# Patient Record
(demographics unavailable — no encounter records)

---

## 2024-10-23 NOTE — PHYSICAL EXAM
[de-identified] : Well-appearing 47-year-old female with obesity.  Physical exam limited due to telehealth

## 2024-10-23 NOTE — HISTORY OF PRESENT ILLNESS
[Home] : at home, [unfilled] , at the time of the visit. [Medical Office: ([facility])___] : at the medical office located in  [Verbal consent obtained from patient] : the patient, [unfilled] [FreeTextEntry1] : Sandie is a 47-year-old female on Zepbound 10 mg here for follow-up regarding medical weight loss management.  Prior to Zepbound, patient used Wegovy for weight loss, weight loss effect plateaued after 6 months of Wegovy, highest dose reached was Wegovy 2.4 mg.  Patient today reports she is tolerating Zepbound 10 mg very well, denies any significant side effects aside from transient tiredness after the injection, which self resolves.  Weight at initial consultation in 2024: 310 pounds (BMI 51.59) Weight at today's follow-up: 297 pounds (BMI 49.42)  Medical history is significant for Hashimoto thyroiditis, borderline dyslipidemia.  Surgical history is significant for 1 . The patient denies prior history of blood clot or abnormal bleeding. The patient denies prior history of dysphagia or reflux.  Patient denies history of pancreatitis, denies personal or family history of thyroid cancer.  Patient reports she has thyroid nodules, which have been followed up by yearly ultrasound, most recent ultrasound was performed on May/23/24.  Patient denies symptoms including but not limited to change of voice, pain in the throat or neck region.  Ultrasound from May, 2324 demonstrated following: Right Lobe: 4.9 cm x 2.4 cm x 2.6 cm. Prominent in size. Normal in echogenicity. There is 2.0 x 1.7 x 1.2 cm heterogeneous predominantly isoechoic nodule in the lower pole laterally, unchanged. There are no calcifications. (TIRAD -3). There is 1.7 x 1.6 x 1.2 cm heterogeneous predominantly isoechoic nodule in the lower pole medially, unchanged. There are no calcifications. (TIRAD -3).  Left Lobe: 4.7 cm x 1.6 cm x 1.8 cm. Normal in size and echogenicity. There is 4 mm benign colloid cyst in upper pole, unchanged (TIRAD -1).  Isthmus: 4 mm.  Cervical Lymph Nodes: No enlarged or abnormal morphology cervical nodes.  IMPRESSION: Interval stability.  Right thyroid nodules, not significantly changed. Consider follow-up ultrasound in one year.  TI-RAD 3: Mildly suspicious (FNA if > 2.5 cm, Follow if > 1.5 cm)   Patient reports her PCP has discussed with her regarding FNA for thyroid nodules, however patient has not done so.

## 2024-10-23 NOTE — PHYSICAL EXAM
[de-identified] : Well-appearing 47-year-old female with obesity.  Physical exam limited due to telehealth

## 2024-10-23 NOTE — ASSESSMENT
[FreeTextEntry1] : I had a lengthy discussion with the patient regarding nutrition, exercise, weight loss medications.  Considering patient is tolerating Zepbound 10 mg well without any significant side effects, I discussed with patient regarding the option of increasing the dose to 12.5 mg, patient reports she prefers to stay on the 10 mg for the time being since she is tolerating the 10 mg well and losing weight.   Patient will work on the following: -Meet with nutritionist as needed -Eliminate snacks -Focus on eating 3 well-balanced meals during the daytime with appropriate portion size -Cooking fresh meals rather than take out/processed/ready-made foods -Incorporating exercise; walk 8-10k steps daily -Continue with Zepbound 10 mg -Advised patient to follow-up with her PCP regarding thyroid nodules and obtain FNA.  Patient is also advised if experience symptoms such as but not limited to change of voice, pain in the throat or neck region, stop Zepbound right away and to contact our office.     Discussed with the pt of the warnings of pregnancy while on Zepbound - instructed pt to avoid planned pregnancy and use of contraception - advised pt to stop immediately if becomes pregnant   Pt verbalized understanding of the above Pt will call office immediately for any side effects. Pt verbalizes understanding and wishes to proceed with medical therapy. Patient educated to call with questions/concerns. All questions answered.   Patient is advised to call after 1 month for possible dose escalation and make 2 month follow up appointment.  Patient verbalized understanding.  Kirill Torres  [facility]. [place], [place] Tel: [phone] Fax: [phone]

## 2024-10-23 NOTE — ASSESSMENT
[FreeTextEntry1] : I had a lengthy discussion with the patient regarding nutrition, exercise, weight loss medications.  Considering patient is tolerating Zepbound 10 mg well without any significant side effects, I discussed with patient regarding the option of increasing the dose to 12.5 mg, patient reports she prefers to stay on the 10 mg for the time being since she is tolerating the 10 mg well and losing weight.   Patient will work on the following: -Meet with nutritionist as needed -Eliminate snacks -Focus on eating 3 well-balanced meals during the daytime with appropriate portion size -Cooking fresh meals rather than take out/processed/ready-made foods -Incorporating exercise; walk 8-10k steps daily -Continue with Zepbound 10 mg -Advised patient to follow-up with her PCP regarding thyroid nodules and obtain FNA.  Patient is also advised if experience symptoms such as but not limited to change of voice, pain in the throat or neck region, stop Zepbound right away and to contact our office.     Discussed with the pt of the warnings of pregnancy while on Zepbound - instructed pt to avoid planned pregnancy and use of contraception - advised pt to stop immediately if becomes pregnant   Pt verbalized understanding of the above Pt will call office immediately for any side effects. Pt verbalizes understanding and wishes to proceed with medical therapy. Patient educated to call with questions/concerns. All questions answered.   Patient is advised to call after 1 month for possible dose escalation and make 2 month follow up appointment.  Patient verbalized understanding.  Kirill Torres  [facility] [place]. [place], [place] Tel: [phone] Fax: [phone]

## 2024-10-23 NOTE — HISTORY OF PRESENT ILLNESS
[Home] : at home, [unfilled] , at the time of the visit. [Medical Office: ([facility])___] : at the medical office located in  [Verbal consent obtained from patient] : the patient, [unfilled] [FreeTextEntry1] : Sandie is a 47-year-old female on Zepbound 10 mg here for follow-up regarding medical weight loss management.  Prior to Zepbound, patient used Wegovy for weight loss, weight loss effect plateaued after 6 months of Wegovy, highest dose reached was Wegovy 2.4 mg.  Patient today reports she is tolerating Zepbound 10 mg very well, denies any significant side effects aside from transient tiredness after the injection, which self resolves.  Weight at initial consultation in 2024: 310 pounds (BMI 51.59) Weight at today's follow-up: 297 pounds (BMI 49.42)  Medical history is significant for Hashimoto thyroiditis, borderline dyslipidemia.  Surgical history is significant for 1 . The patient denies prior history of blood clot or abnormal bleeding. The patient denies prior history of dysphagia or reflux.  Patient denies history of pancreatitis, denies personal or family history of thyroid cancer.  Patient reports she has thyroid nodules, which have been followed up by yearly ultrasound, most recent ultrasound was performed on May/23/24.  Patient denies symptoms including but not limited to change of voice, pain in the throat or neck region.  Ultrasound from May, 2324 demonstrated following: Right Lobe: 4.9 cm x 2.4 cm x 2.6 cm. Prominent in size. Normal in echogenicity. There is 2.0 x 1.7 x 1.2 cm heterogeneous predominantly isoechoic nodule in the lower pole laterally, unchanged. There are no calcifications. (TIRAD -3). There is 1.7 x 1.6 x 1.2 cm heterogeneous predominantly isoechoic nodule in the lower pole medially, unchanged. There are no calcifications. (TIRAD -3).  Left Lobe: 4.7 cm x 1.6 cm x 1.8 cm. Normal in size and echogenicity. There is 4 mm benign colloid cyst in upper pole, unchanged (TIRAD -1).  Isthmus: 4 mm.  Cervical Lymph Nodes: No enlarged or abnormal morphology cervical nodes.  IMPRESSION: Interval stability.  Right thyroid nodules, not significantly changed. Consider follow-up ultrasound in one year.  TI-RAD 3: Mildly suspicious (FNA if > 2.5 cm, Follow if > 1.5 cm)   Patient reports her PCP has discussed with her regarding FNA for thyroid nodules, however patient has not done so.

## 2024-12-30 NOTE — ASSESSMENT
[FreeTextEntry1] : I had a lengthy discussion with the patient regarding nutrition, exercise, weight loss medications.    I emphasized the importance of making healthier food choices including fresh fruits and vegetables, lean meats, and protein sources. I recommended front loading calories, incorporating whole grains, and eliminating fast foods. I also discussed the importance of avoiding fried/fatty foods and foods containing high sugar content including juices/shakes/sodas/desserts.   I also encouraged beginning an exercise program and recommended cardiovascular exercises along with strength training to build lean muscle. I made suggestions on different types of exercises to try.   Patient will work on the following: -Meet with nutritionist -Eliminate snacks -Focus on eating 3 well-balanced meals during the daytime with appropriate portion size -Cooking fresh meals rather than take out/processed/ready-made foods -Incorporating exercise; walk 8-10k steps daily -Obtain bloodwork     I have discussed With patient regarding continuing with Zepbound 12.5 mg considering she is tolerating 12.5 mg well with no significant side effects and losing weight successfully.  Patient verbalized understanding and is agreeable to the plan.All risks and benefits have been discussed with the patient.   Currently there are no contraindications for the use of  zepbound after reviewing pts medical history and labs including personal or family history of thyroid cancer or MEN2. Possible side effects were discussed with the patient including nausea, reflux, constipation, delayed gastric emptying, or pancreatitis.    Discussed with the patient of the warnings of pregnancy while on zepbound  - instructed pt to avoid planned pregnancy and use of contraception - advised pt to stop immediately if becomes pregnant Pt verbalized understanding of the above   Pt verbalizes understanding and wishes to proceed with medical therapy. Continue with Zepbound 12.5 mgbased on authorization and tolerance. Patient educated to call with questions/concerns. All questions answered.  Patient advised to stop Zepbound and contact our office right away if symptoms including but not limited to dysphagia, change of voice occur.    Patient is advised to call after 3rd dose for possible dose escalation and make 2 month follow up appointment.  Patient verbalized understanding.  [address] [place] [address] Tel: [phone] Fax: [phone].

## 2024-12-30 NOTE — PHYSICAL EXAM
[de-identified] : Well-appearing 47-year-old female with obesity.  Physical examination limited due to telehealth.

## 2024-12-30 NOTE — HISTORY OF PRESENT ILLNESS
[Home] : at home, [unfilled] , at the time of the visit. [Medical Office: ([facility])___] : at the medical office located in  [Verbal consent obtained from patient] : the patient, [unfilled] [FreeTextEntry1] : Sandie is a 47-year-old female on Zepbound 12.5 mg here for follow-up regarding medical weight loss management. Prior to Zepbound, patient used Wegovy for weight loss, weight loss effect plateaued after 6 months of Wegovy, highest dose reached was Wegovy 2.4 mg.  Patient reports she is doing well on Zepbound to 12.5 mg, denies any significant side effects including but not limited to nausea, constipation, bloating, acid reflux.  Patient reports due to the holidays, after consuming some fatty foods, she experienced couple of episodes of diarrhea, which self resolved.  Patient reports these couple episodes of diarrhea not likely related to Zepbound since it only occurred after consuming fatty food.  Weight at initial consultation in 2024: 310 pounds (BMI 51.59) Weight at today's follow-up: 287 pounds (BMI 47.76 )  Medical history is significant for Hashimoto thyroiditis, borderline dyslipidemia. Surgical history is significant for 1 . The patient denies prior history of blood clot or abnormal bleeding. The patient denies prior history of dysphagia or reflux.  Patient denies history of pancreatitis, denies personal or family history of thyroid cancer. Patient reports she has thyroid nodules, which have been followed up by yearly ultrasound, most recent ultrasound was performed on May/23/24. Patient denies symptoms including but not limited to change of voice, pain in the throat or neck region.   thyroid ultrasound from May 2024 as As below.  Right Lobe: 4.9 cm x 2.4 cm x 2.6 cm. Prominent in size. Normal in echogenicity. There is 2.0 x 1.7 x 1.2 cm heterogeneous predominantly isoechoic nodule in the lower pole laterally, unchanged. There are no calcifications. (TIRAD -3). There is 1.7 x 1.6 x 1.2 cm heterogeneous predominantly isoechoic nodule in the lower pole medially, unchanged. There are no calcifications. (TIRAD -3).  Left Lobe: 4.7 cm x 1.6 cm x 1.8 cm. Normal in size and echogenicity. There is 4 mm benign colloid cyst in upper pole, unchanged (TIRAD -1).  Isthmus: 4 mm.  Cervical Lymph Nodes: No enlarged or abnormal morphology cervical nodes.  IMPRESSION: Interval stability.  Right thyroid nodules, not significantly changed. Consider follow-up ultrasound in one year.  TI-RAD 3: Mildly suspicious (FNA if > 2.5 cm, Follow if > 1.5 cm)  I previously advised the patient to f/u with the PCP regarding FNA for further eval of thyroid nodules. Pt informed me on 24 that she discussed FNA with PCP, her PCP advised to hold off on the FNA for the time being since patient is asymptomatic and patient advised by her PCP to follow-up thyroid nodule with future thyroid ultrasound. Patient today denies any symptoms including but not limited to dysphagia, change of voice.

## 2024-12-30 NOTE — HISTORY OF PRESENT ILLNESS
[Home] : at home, [unfilled] , at the time of the visit. [Medical Office: ([facility])___] : at the medical office located in  [Verbal consent obtained from patient] : the patient, [unfilled] [FreeTextEntry1] : Sandie is a 47-year-old female on Zepbound 12.5 mg here for follow-up regarding medical weight loss management. Prior to Zepbound, patient used Wegovy for weight loss, weight loss effect plateaued after 6 months of Wegovy, highest dose reached was Wegovy 2.4 mg.  Patient reports she is doing well on Zepbound to 12.5 mg, denies any significant side effects including but not limited to nausea, constipation, bloating, acid reflux.  Patient reports due to the holidays, after consuming some fatty foods, she experienced couple of episodes of diarrhea, which self resolved.  Patient reports these couple episodes of diarrhea not likely related to Zepbound since it only occurred after consuming fatty food.  Weight at initial consultation in 2024: 310 pounds (BMI 51.59) Weight at today's follow-up: 287 pounds (BMI 47.76 )  Medical history is significant for Hashimoto thyroiditis, borderline dyslipidemia. Surgical history is significant for 1 . The patient denies prior history of blood clot or abnormal bleeding. The patient denies prior history of dysphagia or reflux.  Patient denies history of pancreatitis, denies personal or family history of thyroid cancer. Patient reports she has thyroid nodules, which have been followed up by yearly ultrasound, most recent ultrasound was performed on May/23/24. Patient denies symptoms including but not limited to change of voice, pain in the throat or neck region.   thyroid ultrasound from May 2024 as As below.  Right Lobe: 4.9 cm x 2.4 cm x 2.6 cm. Prominent in size. Normal in echogenicity. There is 2.0 x 1.7 x 1.2 cm heterogeneous predominantly isoechoic nodule in the lower pole laterally, unchanged. There are no calcifications. (TIRAD -3). There is 1.7 x 1.6 x 1.2 cm heterogeneous predominantly isoechoic nodule in the lower pole medially, unchanged. There are no calcifications. (TIRAD -3).  Left Lobe: 4.7 cm x 1.6 cm x 1.8 cm. Normal in size and echogenicity. There is 4 mm benign colloid cyst in upper pole, unchanged (TIRAD -1).  Isthmus: 4 mm.  Cervical Lymph Nodes: No enlarged or abnormal morphology cervical nodes.  IMPRESSION: Interval stability.  Right thyroid nodules, not significantly changed. Consider follow-up ultrasound in one year.  TI-RAD 3: Mildly suspicious (FNA if > 2.5 cm, Follow if > 1.5 cm)  I previously advised the patient to f/u with the PCP regarding FNA for further eval of thyroid nodules. Pt informed me on 24 that she discussed FNA with PCP, her PCP advised to hold off on the FNA for the time being since patient is asymptomatic and patient advised by her PCP to follow-up thyroid nodule with future thyroid ultrasound. Patient today denies any symptoms including but not limited to dysphagia, change of voice.

## 2024-12-30 NOTE — PHYSICAL EXAM
[de-identified] : Well-appearing 47-year-old female with obesity.  Physical examination limited due to telehealth.

## 2024-12-30 NOTE — ASSESSMENT
[FreeTextEntry1] : I had a lengthy discussion with the patient regarding nutrition, exercise, weight loss medications.    I emphasized the importance of making healthier food choices including fresh fruits and vegetables, lean meats, and protein sources. I recommended front loading calories, incorporating whole grains, and eliminating fast foods. I also discussed the importance of avoiding fried/fatty foods and foods containing high sugar content including juices/shakes/sodas/desserts.   I also encouraged beginning an exercise program and recommended cardiovascular exercises along with strength training to build lean muscle. I made suggestions on different types of exercises to try.   Patient will work on the following: -Meet with nutritionist -Eliminate snacks -Focus on eating 3 well-balanced meals during the daytime with appropriate portion size -Cooking fresh meals rather than take out/processed/ready-made foods -Incorporating exercise; walk 8-10k steps daily -Obtain bloodwork     I have discussed With patient regarding continuing with Zepbound 12.5 mg considering she is tolerating 12.5 mg well with no significant side effects and losing weight successfully.  Patient verbalized understanding and is agreeable to the plan.All risks and benefits have been discussed with the patient.   Currently there are no contraindications for the use of  zepbound after reviewing pts medical history and labs including personal or family history of thyroid cancer or MEN2. Possible side effects were discussed with the patient including nausea, reflux, constipation, delayed gastric emptying, or pancreatitis.    Discussed with the patient of the warnings of pregnancy while on zepbound  - instructed pt to avoid planned pregnancy and use of contraception - advised pt to stop immediately if becomes pregnant Pt verbalized understanding of the above   Pt verbalizes understanding and wishes to proceed with medical therapy. Continue with Zepbound 12.5 mgbased on authorization and tolerance. Patient educated to call with questions/concerns. All questions answered.  Patient advised to stop Zepbound and contact our office right away if symptoms including but not limited to dysphagia, change of voice occur.    Patient is advised to call after 3rd dose for possible dose escalation and make 2 month follow up appointment.  Patient verbalized understanding.  [address] [place] [address] Tel: [phone] Fax: [phone].

## 2025-01-02 NOTE — REASON FOR VISIT
[Consultation] : a consultation visit [FreeTextEntry1] : DNA Ab positive; Natalie Hills MD [address], [place] [address]

## 2025-01-02 NOTE — ASSESSMENT
[FreeTextEntry1] : 1.  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  Her JESE was checked and noted to be 1/320 (H).  She has not had any symptoms suggestive of SLE or any other inflammatory disease.  No fever, oral ulcers, serositis, thrombosis. During 2017 and into 2018 she has noted arthralgias of her hands but no bettina swelling.  Her right knee has been painful.  The sun sensitivity has persisted, and she has noted hair loss. No fever, oral ulcers, serositis. When checked, her DNA antibodies have been positive.  Her visit with me prior to 2025 was in 2018.  In the interim, she has been asymptomatic.  2.  Hashimoto's thyroiditis:  anti-thyroglobulin Ab positive  Plan: 1.  Lab tests 2.  Contact me 1 week

## 2025-01-02 NOTE — HISTORY OF PRESENT ILLNESS
[FreeTextEntry1] : 1.  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  Her JESE was checked and noted to be 1/320 (H).  She has not had any symptoms suggestive of SLE or any other inflammatory disease.  No fever, oral ulcers, serositis, thrombosis. During 2017 and into 2018 she has noted arthralgias of her hands but no bettina swelling.  Her right knee has been painful.  The sun sensitivity has persisted, and she has noted hair loss. No fever, oral ulcers, serositis. When checked, her DNA antibodies have been positive.  Her visit with me prior to 2025 was in 2018.  In the interim, she has been asymptomatic.  2.  Hashimoto's thyroiditis:  anti-thyroglobulin Ab positive  Meds Synthroid .05 mg/d MVI Tumeric

## 2025-01-02 NOTE — PHYSICAL EXAM
[General Appearance - Alert] : alert [General Appearance - In No Acute Distress] : in no acute distress [General Appearance - Well Nourished] : well nourished [Sclera] : the sclera and conjunctiva were normal [Extraocular Movements] : extraocular movements were intact [Strabismus] : no strabismus was seen [Outer Ear] : the ears and nose were normal in appearance [Oropharynx] : the oropharynx was normal [Neck Appearance] : the appearance of the neck was normal [Neck Cervical Mass (___cm)] : no neck mass was observed [Jugular Venous Distention Increased] : there was no jugular-venous distention [Thyroid Diffuse Enlargement] : the thyroid was not enlarged [Auscultation Breath Sounds / Voice Sounds] : lungs were clear to auscultation bilaterally [Heart Rate And Rhythm] : heart rate was normal and rhythm regular [Heart Sounds] : normal S1 and S2 [Heart Sounds Gallop] : no gallops [Murmurs] : no murmurs [Heart Sounds Pericardial Friction Rub] : no pericardial rub [Arterial Pulses Carotid] : carotid pulses were normal with no bruits [Full Pulse] : the pedal pulses are present [Edema] : there was no peripheral edema [Bowel Sounds] : normal bowel sounds [Abdomen Soft] : soft [Abdomen Tenderness] : non-tender [Abdomen Mass (___ Cm)] : no abdominal mass palpated [Cervical Lymph Nodes Enlarged Posterior Bilaterally] : posterior cervical [Cervical Lymph Nodes Enlarged Anterior Bilaterally] : anterior cervical [Supraclavicular Lymph Nodes Enlarged Bilaterally] : supraclavicular [No CVA Tenderness] : no ~M costovertebral angle tenderness [No Spinal Tenderness] : no spinal tenderness [Skin Color & Pigmentation] : normal skin color and pigmentation [Skin Turgor] : normal skin turgor [] : no rash [Sensation] : the sensory exam was normal to light touch and pinprick [Motor Exam] : the motor exam was normal [Oriented To Time, Place, And Person] : oriented to person, place, and time [Impaired Insight] : insight and judgment were intact [Affect] : the affect was normal [Abnormal Walk] : normal gait [Nail Clubbing] : no clubbing  or cyanosis of the fingernails [Musculoskeletal - Swelling] : no joint swelling seen [Motor Tone] : muscle strength and tone were normal

## 2025-05-27 NOTE — HISTORY OF PRESENT ILLNESS
[FreeTextEntry1] : fuv-telehealth    This visit was provided via telehealth using real-time 2-way audio visual technology. The patient, NOAH DIAZ was located at home in [place] at the time of the visit. The provider, DR. Natalie Hills, was located at [place], [place] at the time of the visit. The patient and Provider participated in the telehealth encounter. Verbal consent for telehealth services was given by the patient. [de-identified] : NOAH DIAZ is a 46 yo woman with thyroid nodule, abnormal JESE, sed rate, DS DNA, obesity here for a fuv for headache.  Headaches intermittently for months.  No visual symptoms, no weakness.  SOme neck pain.   She has been well overall.   Has seen rheum Dr Maldonado and endocrine Dr Chen.    Tdap utd approx 2019.  Pneumovax utd with Dr Ferrera.   The patient is  with two children and a step son.  Her daughter is under treatment for prolactinoma.  She works as a RN in [employer].  She would have no difficulty walking 4 to 6 blocks.

## 2025-05-27 NOTE — REVIEW OF SYSTEMS
[Headache] : headache [Fever] : no fever [Vision Problems] : no vision problems [Nasal Discharge] : no nasal discharge [Chest Pain] : no chest pain [Shortness Of Breath] : no shortness of breath [Abdominal Pain] : no abdominal pain